# Patient Record
Sex: MALE | Race: WHITE
[De-identification: names, ages, dates, MRNs, and addresses within clinical notes are randomized per-mention and may not be internally consistent; named-entity substitution may affect disease eponyms.]

---

## 2017-12-17 ENCOUNTER — HOSPITAL ENCOUNTER (INPATIENT)
Dept: HOSPITAL 92 - ERS | Age: 82
LOS: 4 days | Discharge: HOME HEALTH | DRG: 286 | End: 2017-12-21
Attending: HOSPITALIST | Admitting: HOSPITALIST
Payer: MEDICARE

## 2017-12-17 VITALS — BODY MASS INDEX: 30.6 KG/M2

## 2017-12-17 DIAGNOSIS — N18.2: ICD-10-CM

## 2017-12-17 DIAGNOSIS — I25.10: ICD-10-CM

## 2017-12-17 DIAGNOSIS — I13.0: Primary | ICD-10-CM

## 2017-12-17 DIAGNOSIS — N40.0: ICD-10-CM

## 2017-12-17 DIAGNOSIS — Z86.73: ICD-10-CM

## 2017-12-17 DIAGNOSIS — F41.9: ICD-10-CM

## 2017-12-17 DIAGNOSIS — H35.30: ICD-10-CM

## 2017-12-17 DIAGNOSIS — E22.2: ICD-10-CM

## 2017-12-17 DIAGNOSIS — E66.9: ICD-10-CM

## 2017-12-17 DIAGNOSIS — Z79.02: ICD-10-CM

## 2017-12-17 DIAGNOSIS — S32.031S: ICD-10-CM

## 2017-12-17 DIAGNOSIS — I95.89: ICD-10-CM

## 2017-12-17 DIAGNOSIS — I50.33: ICD-10-CM

## 2017-12-17 DIAGNOSIS — E87.6: ICD-10-CM

## 2017-12-17 DIAGNOSIS — K59.09: ICD-10-CM

## 2017-12-17 DIAGNOSIS — F32.9: ICD-10-CM

## 2017-12-17 DIAGNOSIS — I35.0: ICD-10-CM

## 2017-12-17 LAB
ALP SERPL-CCNC: 66 U/L (ref 40–150)
ALT SERPL W P-5'-P-CCNC: 11 U/L (ref 8–55)
ANION GAP SERPL CALC-SCNC: 11 MMOL/L (ref 10–20)
AST SERPL-CCNC: 18 U/L (ref 5–34)
BASOPHILS # BLD AUTO: 0.1 THOU/UL (ref 0–0.2)
BASOPHILS NFR BLD AUTO: 0.6 % (ref 0–1)
BILIRUB SERPL-MCNC: 0.6 MG/DL (ref 0.2–1.2)
BUN SERPL-MCNC: 17 MG/DL (ref 8.4–25.7)
CALCIUM SERPL-MCNC: 8.6 MG/DL (ref 7.8–10.44)
CHLORIDE SERPL-SCNC: 93 MMOL/L (ref 98–107)
CO2 SERPL-SCNC: 25 MMOL/L (ref 23–31)
CREAT CL PREDICTED SERPL C-G-VRATE: 0 ML/MIN (ref 70–130)
EOSINOPHIL # BLD AUTO: 0.2 THOU/UL (ref 0–0.7)
EOSINOPHIL NFR BLD AUTO: 2.6 % (ref 0–10)
GLOBULIN SER CALC-MCNC: 3.2 G/DL (ref 2.4–3.5)
HCT VFR BLD CALC: 39.2 % (ref 42–52)
LYMPHOCYTES # BLD: 1.1 THOU/UL (ref 1.2–3.4)
LYMPHOCYTES NFR BLD AUTO: 12.4 % (ref 21–51)
MONOCYTES # BLD AUTO: 0.7 THOU/UL (ref 0.11–0.59)
MONOCYTES NFR BLD AUTO: 8 % (ref 0–10)
NEUTROPHILS # BLD AUTO: 6.7 THOU/UL (ref 1.4–6.5)
RBC # BLD AUTO: 4.03 MILL/UL (ref 4.7–6.1)
TROPONIN I SERPL DL<=0.01 NG/ML-MCNC: (no result) NG/ML (ref ?–0.03)
WBC # BLD AUTO: 8.8 THOU/UL (ref 4.8–10.8)

## 2017-12-17 PROCEDURE — 93454 CORONARY ARTERY ANGIO S&I: CPT

## 2017-12-17 PROCEDURE — 84550 ASSAY OF BLOOD/URIC ACID: CPT

## 2017-12-17 PROCEDURE — 84484 ASSAY OF TROPONIN QUANT: CPT

## 2017-12-17 PROCEDURE — 36416 COLLJ CAPILLARY BLOOD SPEC: CPT

## 2017-12-17 PROCEDURE — 85025 COMPLETE CBC W/AUTO DIFF WBC: CPT

## 2017-12-17 PROCEDURE — 83930 ASSAY OF BLOOD OSMOLALITY: CPT

## 2017-12-17 PROCEDURE — C1769 GUIDE WIRE: HCPCS

## 2017-12-17 PROCEDURE — 80069 RENAL FUNCTION PANEL: CPT

## 2017-12-17 PROCEDURE — 36415 COLL VENOUS BLD VENIPUNCTURE: CPT

## 2017-12-17 PROCEDURE — 85610 PROTHROMBIN TIME: CPT

## 2017-12-17 PROCEDURE — C1760 CLOSURE DEV, VASC: HCPCS

## 2017-12-17 PROCEDURE — 83935 ASSAY OF URINE OSMOLALITY: CPT

## 2017-12-17 PROCEDURE — 80048 BASIC METABOLIC PNL TOTAL CA: CPT

## 2017-12-17 PROCEDURE — 93005 ELECTROCARDIOGRAM TRACING: CPT

## 2017-12-17 PROCEDURE — 71010: CPT

## 2017-12-17 PROCEDURE — 80053 COMPREHEN METABOLIC PANEL: CPT

## 2017-12-17 PROCEDURE — A4216 STERILE WATER/SALINE, 10 ML: HCPCS

## 2017-12-17 PROCEDURE — 93798 PHYS/QHP OP CAR RHAB W/ECG: CPT

## 2017-12-17 PROCEDURE — 93306 TTE W/DOPPLER COMPLETE: CPT

## 2017-12-17 PROCEDURE — 76942 ECHO GUIDE FOR BIOPSY: CPT

## 2017-12-17 PROCEDURE — 82553 CREATINE MB FRACTION: CPT

## 2017-12-17 PROCEDURE — 83880 ASSAY OF NATRIURETIC PEPTIDE: CPT

## 2017-12-17 NOTE — RAD
RADIOGRAPH CHEST 1 VIEW:

 

Date: 12-17-17

Time: 8:52 p.m.

 

HISTORY: 

88-year-old male with dyspnea and chest tightness.

 

COMPARISON: 

Although the patient has had numerous prior imaging studies, none of them involve the chest. 

 

FINDINGS:

There are mild to moderate interstitial densities throughout the left mid and lower lung zones, and a
t the right base. The rest of the right lung is relatively clear. No cardiomegaly or pneumothorax. 

 

IMPRESSION:

Interstitial densities involving much of the left lung and the right base. It is uncertain whether th
jerrica are acute, chronic, or a combination of both. 

 

 

ABHINAV 

 

POS: GISEL

## 2017-12-18 LAB
ANION GAP SERPL CALC-SCNC: 10 MMOL/L (ref 10–20)
ANION GAP SERPL CALC-SCNC: 11 MMOL/L (ref 10–20)
ANION GAP SERPL CALC-SCNC: 12 MMOL/L (ref 10–20)
BASOPHILS # BLD AUTO: 0.1 THOU/UL (ref 0–0.2)
BASOPHILS NFR BLD AUTO: 0.9 % (ref 0–1)
BUN SERPL-MCNC: 14 MG/DL (ref 8.4–25.7)
BUN SERPL-MCNC: 15 MG/DL (ref 8.4–25.7)
BUN SERPL-MCNC: 18 MG/DL (ref 8.4–25.7)
CALCIUM SERPL-MCNC: 8.4 MG/DL (ref 7.8–10.44)
CALCIUM SERPL-MCNC: 8.7 MG/DL (ref 7.8–10.44)
CALCIUM SERPL-MCNC: 8.7 MG/DL (ref 7.8–10.44)
CHLORIDE SERPL-SCNC: 93 MMOL/L (ref 98–107)
CO2 SERPL-SCNC: 27 MMOL/L (ref 23–31)
CO2 SERPL-SCNC: 29 MMOL/L (ref 23–31)
CO2 SERPL-SCNC: 30 MMOL/L (ref 23–31)
CREAT CL PREDICTED SERPL C-G-VRATE: 81 ML/MIN (ref 70–130)
CREAT CL PREDICTED SERPL C-G-VRATE: 89 ML/MIN (ref 70–130)
CREAT CL PREDICTED SERPL C-G-VRATE: 95 ML/MIN (ref 70–130)
EOSINOPHIL # BLD AUTO: 0.3 THOU/UL (ref 0–0.7)
EOSINOPHIL NFR BLD AUTO: 3.8 % (ref 0–10)
HCT VFR BLD CALC: 37.7 % (ref 42–52)
LYMPHOCYTES # BLD: 1.6 THOU/UL (ref 1.2–3.4)
LYMPHOCYTES NFR BLD AUTO: 20.5 % (ref 21–51)
MONOCYTES # BLD AUTO: 0.8 THOU/UL (ref 0.11–0.59)
MONOCYTES NFR BLD AUTO: 11.1 % (ref 0–10)
NEUTROPHILS # BLD AUTO: 4.8 THOU/UL (ref 1.4–6.5)
PROTHROMBIN TIME: 13.4 SEC (ref 12–14.7)
RBC # BLD AUTO: 3.92 MILL/UL (ref 4.7–6.1)
TROPONIN I SERPL DL<=0.01 NG/ML-MCNC: 0.01 NG/ML (ref ?–0.03)
URATE SERPL-MCNC: 4.2 MG/DL (ref 3.5–7.2)
WBC # BLD AUTO: 7.5 THOU/UL (ref 4.8–10.8)

## 2017-12-18 RX ADMIN — Medication PRN ML: at 06:28

## 2017-12-18 NOTE — PDOC.EVN
Event Note





- Event Note


Event Note: 





[id number]


H&P Dictated


1. Acute CHF exacerbation


2. HTN


3. H/O BPH





Plan:


see orders

## 2017-12-18 NOTE — HP
DATE OF ADMISSION:  12/17/2017

 

CHIEF COMPLAINT:  Dyspnea.

 

HISTORY OF PRESENT ILLNESS:  The patient is an 88-year-old male with past medical history of congesti
ve heart failure, hypertension, transient ischemic attack, benign prostatic hypertrophy, came to the 
ER complaining of dyspnea, dyspnea started few days back, dyspnea _____, lying flat.  Denies chest pa
in, denies any chest tightness.  Complains of some low grade fever also.  The patient complains of so
me nausea and decreased p.o. intake and fatigue.  The patient's symptoms persisted that is why he cam
e to the ER.  The patient complains of chronic lower extremity swelling, but swelling is more pronoun
iglesia now, but improving with stockings.  Denies any fever, denies any cough, denies sputum production.


 

PAST MEDICAL HISTORY:  As per HPI.

 

PAST SURGICAL HISTORY:  Colon resection.

 

SOCIAL HISTORY:  Positive for alcohol usage.  Denies smoking.  Denies any drugs.

 

FAMILY HISTORY:  Denies any heart problems.

 

REVIEW OF SYSTEMS:  Constitutional:  Reports low grade fever.  Denies any chills.  Eyes:  Denies any 
vision problems.  Ears:  Denies any hearing loss.  Neck:  Denies any neck pain.  Cardiovascular syste
m:  Positive for chest pain, tightness, mild.  Respiratory system:  Positive for dyspnea.  Gastrointe
stinal:  Positive for nausea.  Cranial nerve system:  Denies any syncope, denies lightheadedness.  Mu
sculoskeletal:  Positive for bilateral lower extremity swelling.  Integumentary:  Denies any rash.  A
ll other review of systems are reviewed and are negative.

 

PHYSICAL EXAMINATION:

CONSTITUTIONAL/VITAL SIGNS:  At the time of H&P performed, blood pressure is 150/71, pulse ox 96%, he
art rate 61, respiratory rate 18.

GENERAL APPEARANCE:  The patient appears tired.

HEENT:  Anterior nares patent.

NECK:  Supple.  No JVD.

RESPIRATORY SYSTEM:  Positive for crackles.  Positive for occasional wheeze, no rhonchi.  Normal effo
rt.

CARDIOVASCULAR SYSTEM:  S1, S2 present.  Regular rate and rhythm.  No murmurs, no rubs, no gallops.

GASTROINTESTINAL:  Abdomen is soft, nontender, no guarding, no organomegaly, no masses felt.

MUSCULOSKELETAL:  Bilateral lower extremities, 2+ pitting edema present.  Moves all joints.

PSYCHIATRIC:  Mood is appropriate at this time.

INTEGUMENTARY:  No obvious rashes seen.

 

LABORATORY DATA:  At the time of H&P performed, white count 8.8, hemoglobin 13, and platelet count is
 262.  BMP shows sodium 125, potassium 4.4, chloride 93, CO2 of 25, BUN of 17, creatinine 0.82.  BNP 
897, troponin less than 0.010.  Chest x-ray positive for interstitial densities involving left lung a
nd the right base.

 

ASSESSMENT AND PLAN:  The patient is an 88-year-old male.

1.  Acute congestive heart failure exacerbation, possible systolic.  Plan to start the patient on IV 
Lasix 40 mg b.i.d.  Plan to monitor the patient closely.

2.  Hyponatremia.  Monitor sodium level closely.  Plan to consult Nephrology to evaluate the patient 
and we will check repeat BMP in 4 hours.

3.  History of hypertension.  Monitor blood pressure.  Continue home blood pressure medications.

4.  History of benign prostatic hypertrophy.  Continue Flomax.

5.  History of transient ischemic attack.  Continue Plavix.

 

The case was discussed in detail with the patient and patient's family members also.

## 2017-12-18 NOTE — CON
DATE OF SERVICE:  12/18/2017

 

CARDIOLOGY CONSULTATION

 

INDICATION FOR CONSULTATION:  An 88-year-old gentleman with new onset of congestive heart failure.

 

HISTORY OF PRESENT ILLNESS:  This 88-year-old gentleman who has been followed by Dr. Christensen for qu
ite some time now.  He has a history of aortic valve stenosis as well as hypertension.  He recently w
as seen with some hyponatremia, I believe in an outside facility and was treated with diuretics and i
mproved.  He then yesterday started developing increasing shortness of breath and dyspnea.  He was br
ought to the hospital by his family and was admitted with some congestive heart failure symptoms and 
hyponatremia.  At this time, he denied any chest pain.  He does not have any significant shortness of
 breath at this time, but he is lying in the bed and is inactive.  He did have an echocardiogram in J
roberto carlos of last year which showed an ejection fraction of 55%-60% with severe aortic valve stenosis, aort
ic valve area 0.62 cm2.  There has been no intervention or further workup for the aortic valve since 
that time.  He does have mild mitral and tricuspid valve regurgitation also.  At this time, he has di
uresed quite a bit and is feeling better and will continue on his present medications.

 

PAST MEDICAL HISTORY:  Significant for hypertension, TIA, aortic valve stenosis, L1 burst fracture, h
istory of colon cancer with colectomy.  He also has obesity.

 

SOCIAL HISTORY:  He has 2 children who are alive and well.  There is no alcohol or tobacco abuse.

 

FAMILY HISTORY:  Noncontributory.

 

ALLERGIES:  None.

 

MEDICATIONS:  Prior to admission included Coreg at 12.5 mg b.i.d. He is taking Plavix 75 mg a day, ca
lcitonin, tamsulosin, takes timolol ophthalmic drops, Lumigan ophthalmic drops, lorazepam, tramadol, 
and hydrocortisone cream.

 

REVIEW OF SYSTEMS:  Twelve point review of systems is unremarkable except what was noted in the histo
ry of present illness.  He does have macular degeneration and takes ophthalmic drops.  He also has ch
ronic constipation and takes a stool softer on a daily basis at least every other day.

 

PHYSICAL EXAMINATION:

GENERAL:  Reveals an elderly gentleman in no acute distress at this time.

VITAL SIGNS:  Blood pressure 156/70, he was given lisinopril and blood pressure decreased to 91/52, h
eart rate is 73 and irregular.  He is afebrile.  O2 saturation 95%, respiratory rate 18.

HEENT:  Reveals head to be normocephalic and atraumatic.  Carotid pulses are present.  No bruits are 
noted.

CHEST:  Clear to auscultation.  He has very minimal rales noted in the right base.

CARDIOVASCULAR:  There were no significant murmurs, heaves, thrills, bruits or rubs except for the ao
rtic valve stenosis which is a 3/6 murmur which radiates to the upper sternal area.

ABDOMEN:  Shows obesity with positive bowel sounds.

EXTREMITIES:  Showed no clubbing or cyanosis.  He has 1-2+ lower extremity edema in lower legs and th
e ankle area.  Pedal pulses are present.

NEUROLOGIC:  The patient appears to be intact.

 

IMAGING DATA:  EKG shows normal sinus rhythm with a right bundle branch block.

 

LABORATORY DATA:  Shows sodium of 129, potassium 3.6, creatinine 0.83.  His BNP was 897.

 

IMPRESSION:

1.  New onset congestive heart failure which may be due to volume overload associated with perhaps di
astolic dysfunction or due to his aortic valve stenosis or due to just volume overload.  I would agre
e with some diuresis.  At this time he seems to have improved already after starting diuresis.  I wou
ld need to reevaluate the aortic valve by echocardiogram to see whether or not this may be more signi
ficant stenosis and this has been his thought.  He may eventually need to undergo cardiac catheteriza
tion to determine the exact gradient across the aortic valve indeterminate size.  He may be a candida
te to undergo a TAVR versus consensual aortic valve replacement if this is so indicated.

2.  Hyponatremia and hypokalemia.  These will be treated medically and he seems to be improving.  We 
will await results of the echocardiogram prior to further recommendations per Dr. Christensen who will 
resume his care when he sees him tomorrow.

## 2017-12-18 NOTE — PDOC.PN
- Subjective


Encounter Start Date: 12/18/17


Encounter Start Time: 16:25


Subjective: Seen and examined -transient hypotension noted earlier today





- Objective


Vital Signs & Weight: 


 Vital Signs (12 hours)











  Temp Pulse Resp BP Pulse Ox


 


 12/18/17 12:21  98.1 F  59 L  16  128/59 L  95


 


 12/18/17 11:00     91/52 L 


 


 12/18/17 08:45  97.3 F L  73  18  156/70 H  95


 


 12/18/17 08:05  97.3 F L  73  18  








 Weight











Weight                         225 lb 1.6 oz














I&O: 


 











 12/17/17 12/18/17 12/19/17





 06:59 06:59 06:59


 


Intake Total  360 


 


Output Total  800 


 


Balance  -440 











Result Diagrams: 


 12/18/17 05:03





 12/18/17 10:24





Phys Exam





- Physical Examination


Constitutional: NAD


HEENT: PERRLA, moist MMs, sclera anicteric, TM's clear


Neck: no nodes, no JVD, supple, full ROM


Respiratory: no wheezing, no rales, no rhonchi, clear to auscultation bilateral


Cardiovascular: RRR, no significant murmur, no rub


Gastrointestinal: soft, non-tender, no distention, positive bowel sounds


Musculoskeletal: pulses present, edema present





Dx/Plan


(1) Hypotension


Status: Acute   





(2) CHF (congestive heart failure)


Code(s): I50.9 - HEART FAILURE, UNSPECIFIED   Status: Acute   





(3) Hyponatremia


Code(s): E87.1 - HYPO-OSMOLALITY AND HYPONATREMIA   Status: Acute   Comment: 

Improving, Cz=642   





(4) Aortic stenosis, severe


Code(s): I35.0 - NONRHEUMATIC AORTIC (VALVE) STENOSIS   Status: Chronic   





(5) BPH (benign prostatic hyperplasia)


Code(s): N40.0 - BENIGN PROSTATIC HYPERPLASIA WITHOUT LOWER URINRY TRACT SYMP   

Status: Chronic   





- Plan


plan discussed w/ family, PT/OT, respiratory therapy


Reduce Acei dose


-: monitor oerthostasis and BP


-: Cardiology/Renal following


-: Dispo planning





* .

## 2017-12-19 LAB
ANION GAP SERPL CALC-SCNC: 12 MMOL/L (ref 10–20)
BUN SERPL-MCNC: 17 MG/DL (ref 8.4–25.7)
CALCIUM SERPL-MCNC: 9 MG/DL (ref 7.8–10.44)
CHLORIDE SERPL-SCNC: 91 MMOL/L (ref 98–107)
CO2 SERPL-SCNC: 30 MMOL/L (ref 23–31)
CREAT CL PREDICTED SERPL C-G-VRATE: 86 ML/MIN (ref 70–130)
TROPONIN I SERPL DL<=0.01 NG/ML-MCNC: (no result) NG/ML (ref ?–0.03)

## 2017-12-19 PROCEDURE — B2111ZZ FLUOROSCOPY OF MULTIPLE CORONARY ARTERIES USING LOW OSMOLAR CONTRAST: ICD-10-PCS | Performed by: INTERNAL MEDICINE

## 2017-12-19 RX ADMIN — Medication PRN ML: at 06:10

## 2017-12-19 NOTE — CON
DATE OF CONSULTATION:  12/19/2017

 

HISTORY OF PRESENT ILLNESS:  This is an 88-year-old gentleman admitted with congestive heart failure 
symptoms.  He has had progressive dyspnea and lower extremity edema.  He has been followed by Dr. Patricia villasenor for at least the past 6-10 years.  His medical diagnoses include hypertension.  Last year, he 
suffered a compression fracture at the L3 vertebral body and cardiac echo at that time demonstrated s
[id number] aortic valve stenosis.  Repeat echo was done this admission; however, the final rep
ort is not available, but the preliminary report suggested a peak gradient of about 80 with a valve a
digna of 0.65, which is similar, I believe, to the echo about a year and a half ago.  In addition, he h
as undergone cardiac catheterization demonstrating severe circumflex disease prior to a distal obtuse
 marginal and severe proximal LAD disease proximal to an LAD diagonal bifurcation with only mild righ
t coronary artery disease.

 

PAST MEDICAL HISTORY:  As mentioned, his past medical history includes a diagnosis of hypertension as
 well as obesity.  He has had a prior transient ischemic attack and has prostatic hypertrophy.

 

PAST SURGICAL HISTORY:  Includes colon resection and arthroplasty for the L3 burst fracture.

 

SOCIAL HISTORY:  He is a retired , lives alone, although has a daughter and son who pro
vide nearly 24-hour assistance.  He gets around with a wheeled walker and does walk in the driveway e
veryday.

 

IMAGING STUDIES:  Chest x-ray is unremarkable.

 

LABORATORY VALUES:  Notable for a creatinine of 0.9 and a hemoglobin of 12.8.  He does have a first-d
egree AV block as well as a bundle branch block.

 

PHYSICAL EXAMINATION:  To follow.

 

At this time, the patient is recovering from his cardiac catheterization and a recent bout of congest
n[place] heart failure.

 

MEDICATIONS:  Prior to admission included eye drops, Plavix 75 a day, Flomax 0.4 a day, MiraLax daily
, aspirin 81 a day, Coreg 12.5 b.i.d., lorazepam 0.5 p.r.n.  Since in the hospital, he has had the ad
dition of Lasix periodic IV push as well as 2.5 of lisinopril daily.

 

ASSESSMENT AND PLAN:  I have had a long discussion with the son and daughter and at this time, I thin
k the patient should be medically managed for his heart failure and discharged off his Plavix and I w
ill see him back in the office in a couple of weeks and if he is functionally recovered from his hosp
ital stay, consider aortic valve replacement and coronary bypass grafting to the left anterior descen
ding and obtuse marginal.  Preliminary STS risk factors without complete echocardiogram results sugge
st a mortality of 5.7%, morbidity of 31%, stroke of about 3%, renal failure of about 10%, and re-oper
ation of about 11%.

## 2017-12-19 NOTE — PRG
DATE OF SERVICE:  12/19/2017

 

SUBJECTIVE:  This is an 88-year-old gentleman being seen for hyponatremia.  The patient denies any na
usea, vomiting, or chest pain.

 

PHYSICAL EXAMINATION:

GENERAL:  Patient is awake, alert.

VITAL SIGNS:  Afebrile, pulse 58, breathing at 16, blood pressure 121/60.

 

OBJECTIVE:   See above.

Awake, alert, in no acute distress.

GENERAL APPEARANCE AND MENTAL STATUS:  Fair.

HEAD/NECK:  Normocephalic.   Atraumatic.

EYES:  EOMI.  No deformity.

EARS:  Clear.  No ulcers.

NOSE:   Intact.  No lesions.

MOUTH:  Clear.  No discharge.

THROAT:  Clear.  No exudate.

LUNGS:   Clear.  No crackles.

CARDIAC:   S1, S2.  No rub.

ABDOMEN:   Benign.  BS+.

GENITALIA/RECTUM:  Pearce absent.

BACK/EXTREMITIES:  Edema 0+ Ulcer-

NEUROLOGICAL:  Alert and motor intact.                     

SKIN:  Rash- Bruise-  

LYMPHATICS:  Edema- Ulcer-

 

LABORATORY:  Hemoglobin 12.5, sodium is pending. 

 

ASSESSMENT AND RECOMMENDATIONS:  

1.  Stage II chronic kidney disease, stable.

2.  Hyponatremia.  We will recheck sodium today.

3.  Hypertension, stable.

4.  Medications based on glomerular filtration rate are appropriate.

5.  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

## 2017-12-19 NOTE — PRG
DATE OF SERVICE:  12/19/2017

 

Mr. Calvert is feeling much better.  His shortness of breath has improved.  He has diuresed.

 

PHYSICAL EXAMINATION: 

VITAL SIGNS:  Blood pressure 157/70, pulse 89, TEMPERATURE 97.7.

LUNGS:  Clear to auscultation.

CARDIAC:  Regular rate and rhythm with a 2/6 systolic ejection murmur.

ABDOMEN:  Soft.  Nontender.

EXTREMITIES:  No edema.

 

PERTINENT LABORATORY DATA:  Hemoglobin 12.5, creatinine 0.91.

 

Echo with Doppler shows severe aortic stenosis with valve area 0.68 with a mean peak gradient of 90 a
nd 70.

 

IMPRESSION:

1.  Severe aortic stenosis.

2.  Shortness of breath.

 

RECOMMENDATIONS:  I had a long discussion with Mr. Calvert as well as his son about options.  We discus
sed SVAR versus TVAR  in addition to medical therapy.  I did state that medical therapy in this case 
would not be indicated.  I did have a long discussion with the progression of workup including left a
nd right heart catheterization in addition to a consultation with a surgeon to determine his risk.  I
f felt to be an increased risk, I discussed consideration for TVAR.  I discussed coronary angiography
 with possible PCI in full detail.  The risks of the procedure included, but are not limited to the f
ollowing:  Death, stroke, MI, need for emergency surgery, loss of limb, bleeding, and infection, as w
ell as a reaction to the dye causing kidney failure and needing long-term dialysis.  I also discussed
 the risks of PCI to include all of the above including coronary dissection and perforation in additi
on to acute stent thrombosis and restenosis.  All questions about the procedure were answered.  Given
 the above, the patient agreed to proceed with coronary angiography and possible PCI.

 

I also discussed right heart catheterization.  Otherwise, at this point, I have no further recommenda
tions.

## 2017-12-20 LAB
ANION GAP SERPL CALC-SCNC: 12 MMOL/L (ref 10–20)
ANION GAP SERPL CALC-SCNC: 13 MMOL/L (ref 10–20)
BASOPHILS # BLD AUTO: 0 THOU/UL (ref 0–0.2)
BASOPHILS NFR BLD AUTO: 0.5 % (ref 0–1)
BUN SERPL-MCNC: 17 MG/DL (ref 8.4–25.7)
BUN SERPL-MCNC: 19 MG/DL (ref 8.4–25.7)
CALCIUM SERPL-MCNC: 8.5 MG/DL (ref 7.8–10.44)
CALCIUM SERPL-MCNC: 8.8 MG/DL (ref 7.8–10.44)
CHLORIDE SERPL-SCNC: 93 MMOL/L (ref 98–107)
CHLORIDE SERPL-SCNC: 93 MMOL/L (ref 98–107)
CO2 SERPL-SCNC: 28 MMOL/L (ref 23–31)
CO2 SERPL-SCNC: 29 MMOL/L (ref 23–31)
CREAT CL PREDICTED SERPL C-G-VRATE: 73 ML/MIN (ref 70–130)
CREAT CL PREDICTED SERPL C-G-VRATE: 89 ML/MIN (ref 70–130)
EOSINOPHIL # BLD AUTO: 0.3 THOU/UL (ref 0–0.7)
EOSINOPHIL NFR BLD AUTO: 3.3 % (ref 0–10)
HCT VFR BLD CALC: 39.5 % (ref 42–52)
LYMPHOCYTES # BLD: 1.1 THOU/UL (ref 1.2–3.4)
LYMPHOCYTES NFR BLD AUTO: 13.1 % (ref 21–51)
MONOCYTES # BLD AUTO: 1.1 THOU/UL (ref 0.11–0.59)
MONOCYTES NFR BLD AUTO: 13.1 % (ref 0–10)
NEUTROPHILS # BLD AUTO: 5.8 THOU/UL (ref 1.4–6.5)
RBC # BLD AUTO: 4.06 MILL/UL (ref 4.7–6.1)
WBC # BLD AUTO: 8.3 THOU/UL (ref 4.8–10.8)

## 2017-12-20 RX ADMIN — CALCITONIN SALMON SCH SPR: 200 SPRAY, METERED NASAL at 10:06

## 2017-12-20 NOTE — ADD-CON
ADDENDUM

 

Of my consult from yesterday.

 

I had a chance to examine the patient today with the family present.  He is alert and cooperative, re
ceiving an IV bolus of fluid for slightly depressed blood pressure, although he is not symptomatic.  
Neck exam, I do not appreciate any bruits.  Cardiac exam, he has a harsh systolic murmur across the p
recordium.  Lungs are clear to auscultation.  Abdomen was obese and nontender.  Extremities, he has p
alpable femoral and popliteal pulses and I do not appreciate any pedal pulses and he does not have an
y peripheral edema today.

 

I once again discussed the situation this time with the patient primarily going over the options of s
urgical intervention and risks and recovery issues.  I have given the son, the STS risk stratificatio
n for the patient based on the numbers that I had available last night.  His final echo report has be
come available with a peak gradient of about 90, mean gradient of about 50 and an aortic valve area o
f about 0.65.  Peak velocity is about 485 cm per second.  The patient will follow up with me next mon
th if he wishes to discuss this further.  If he wishes to have surgery, he can call the office and 
t that scheduled and if he does not wish to have any intervention, then he will need to follow up live Christensen for medical management.

## 2017-12-20 NOTE — PDOC.PN
- Subjective


Encounter Start Date: 12/19/17


Encounter Start Time: 14:39





Patient seen and examined. No new complaints. No overnight events





- Objective


MAR Reviewed: Yes


Vital Signs & Weight: 


 Vital Signs (12 hours)











  Temp Pulse Resp BP BP Pulse Ox


 


 12/20/17 11:21  97.3 F L  59 L  20   145/65 H  95


 


 12/20/17 08:32   65   82/48 L  


 


 12/20/17 06:13     126/56 L  


 


 12/20/17 04:00  97.5 F L  61  20   122/58 L  100








 Weight











Weight                         212 lb 3 oz














I&O: 


 











 12/19/17 12/20/17 12/21/17





 06:59 06:59 06:59


 


Intake Total 1144 1580 


 


Output Total 1780 3850 


 


Balance -966 -9540 











Result Diagrams: 


 12/20/17 08:53





 12/20/17 08:53





Phys Exam





- Physical Examination


Constitutional: NAD


HEENT: PERRLA


Neck: no JVD


bibasilar rales


Cardiovascular: no significant murmur


Gastrointestinal: non-tender


Musculoskeletal: edema present


Neurological: moves all 4 limbs


Psychiatric: A&O x 3





Dx/Plan


(1) Diastolic CHF, acute on chronic


Code(s): I50.33 - ACUTE ON CHRONIC DIASTOLIC (CONGESTIVE) HEART FAILURE   Status

: Acute   Comment: ef- 55%   





(2) Compression fracture of L3 lumbar vertebra


Code(s): S32.030A - WEDGE COMPRESSION FRACTURE OF THIRD LUMBAR VERTEBRA, INIT   

Status: Acute   Comment: s/p kyphoplasty   





(3) Hyponatremia


Code(s): E87.1 - HYPO-OSMOLALITY AND HYPONATREMIA   Status: Acute   Comment: 

Improving, Nc=663   





(4) Physical deconditioning


Code(s): R53.81 - OTHER MALAISE   Status: Acute   





(5) Anxiety and depression


Code(s): F41.9 - ANXIETY DISORDER, UNSPECIFIED; F32.9 - MAJOR DEPRESSIVE 

DISORDER, SINGLE EPISODE, UNSPECIFIED   Status: Chronic   





(6) Aortic stenosis, severe


Code(s): I35.0 - NONRHEUMATIC AORTIC (VALVE) STENOSIS   Status: Chronic   





(7) BPH (benign prostatic hyperplasia)


Code(s): N40.0 - BENIGN PROSTATIC HYPERPLASIA WITHOUT LOWER URINRY TRACT SYMP   

Status: Chronic   





(8) Hypertension


Code(s): I10 - ESSENTIAL (PRIMARY) HYPERTENSION   Status: Chronic   Comment: BP 

improved.   





- Plan





* cardiac rehab


* cont diuresis


* f/u card plan


* re evaluate for surg as out pt

## 2017-12-20 NOTE — PDOC.PN
- Subjective


Encounter Start Date: 12/20/17


Encounter Start Time: 14:41





breathing better


no cp


had pci 12/19


no n/v





- Objective


MAR Reviewed: Yes


Vital Signs & Weight: 


 Vital Signs (12 hours)











  Temp Pulse Resp BP BP Pulse Ox


 


 12/20/17 11:21  97.3 F L  59 L  20   145/65 H  95


 


 12/20/17 08:32   65   82/48 L  


 


 12/20/17 06:13     126/56 L  


 


 12/20/17 04:00  97.5 F L  61  20   122/58 L  100








 Weight











Weight                         212 lb 3 oz














I&O: 


 











 12/19/17 12/20/17 12/21/17





 06:59 06:59 06:59


 


Intake Total 1144 1580 


 


Output Total 1780 3850 


 


Balance -366 -0680 











Result Diagrams: 


 12/20/17 08:53





 12/20/17 08:53





Phys Exam





- Physical Examination


Constitutional: NAD


HEENT: PERRLA


Neck: no JVD


bibasilar rales


Cardiovascular: RRR


Gastrointestinal: non-tender


Musculoskeletal: edema present


Neurological: moves all 4 limbs


Psychiatric: A&O x 3





Dx/Plan


(1) Diastolic CHF, acute on chronic


Code(s): I50.33 - ACUTE ON CHRONIC DIASTOLIC (CONGESTIVE) HEART FAILURE   Status

: Acute   Comment: ef- 55%   





(2) Compression fracture of L3 lumbar vertebra


Code(s): S32.030A - WEDGE COMPRESSION FRACTURE OF THIRD LUMBAR VERTEBRA, INIT   

Status: Acute   Comment: s/p kyphoplasty   





(3) Hyponatremia


Code(s): E87.1 - HYPO-OSMOLALITY AND HYPONATREMIA   Status: Acute   Comment: 

Improving, Ph=260   





(4) Physical deconditioning


Code(s): R53.81 - OTHER MALAISE   Status: Acute   





(5) Anxiety and depression


Code(s): F41.9 - ANXIETY DISORDER, UNSPECIFIED; F32.9 - MAJOR DEPRESSIVE 

DISORDER, SINGLE EPISODE, UNSPECIFIED   Status: Chronic   





(6) Aortic stenosis, severe


Code(s): I35.0 - NONRHEUMATIC AORTIC (VALVE) STENOSIS   Status: Chronic   





(7) BPH (benign prostatic hyperplasia)


Code(s): N40.0 - BENIGN PROSTATIC HYPERPLASIA WITHOUT LOWER URINRY TRACT SYMP   

Status: Chronic   





(8) Hypertension


Code(s): I10 - ESSENTIAL (PRIMARY) HYPERTENSION   Status: Chronic   Comment: BP 

improved.   





- Plan





* cont current mx


* 


* f/u card plan

## 2017-12-20 NOTE — PRG
DATE OF SERVICE:  12/20/2017

 

SUBJECTIVE:  Mr. Calvert is doing well.  No current complaints.  He did visit with Dr. Velasquez Reis with
 the recommendations below.

 

OBJECTIVE:

VITAL SIGNS:  Blood pressure is 145/65, pulse is 59 and temperature is 97.3.

LUNGS:  Clear to auscultation.

HEART:  Regular rate and rhythm.

ABDOMEN:  Soft, nontender and nondistended.

EXTREMITIES:  No edema.

 

IMPRESSION:

1.  Severe aortic stenosis.

2.  Severe left main disease.

 

RECOMMENDATIONS:  Mr. Calvert has spoken with Dr. Reis at length.  They have decided to proceed with cristina mc as an outpatient.  They would like to increase his ambulation prior to proceeding with AVR a
nd bypass surgery.  Several questions were answered.  From my standpoint, it would be okay for discha
renee from a cardiac standpoint.  We will continue aspirin in addition with carvedilol.  We would stop 
Plavix and continue lisinopril.

## 2017-12-21 VITALS — TEMPERATURE: 95.8 F | SYSTOLIC BLOOD PRESSURE: 152 MMHG | DIASTOLIC BLOOD PRESSURE: 69 MMHG

## 2017-12-21 LAB
ANION GAP SERPL CALC-SCNC: 12 MMOL/L (ref 10–20)
BUN SERPL-MCNC: 23 MG/DL (ref 8.4–25.7)
BUN/CREAT SERPL: 28.4
CALCIUM SERPL-MCNC: 8.3 MG/DL (ref 7.8–10.44)
CHLORIDE SERPL-SCNC: 95 MMOL/L (ref 98–107)
CO2 SERPL-SCNC: 26 MMOL/L (ref 23–31)
CREAT CL PREDICTED SERPL C-G-VRATE: 86 ML/MIN (ref 70–130)

## 2017-12-21 RX ADMIN — Medication PRN ML: at 06:25

## 2017-12-21 RX ADMIN — CALCITONIN SALMON SCH SPR: 200 SPRAY, METERED NASAL at 07:50

## 2017-12-21 NOTE — DIS
DATE OF ADMISSION:  12/18/2017

 

DATE OF DISCHARGE:  12/21/2017

 

DISCHARGE DIAGNOSES:

1.  Coronary artery disease of left main and left anterior descending.

2.  Severe aortic stenosis.

3.  Acute on chronic diastolic congestive heart failure with ejection fraction of 55%, stable.

4.  Hypertension.

5.  Transient ischemic attack, stable.

6.  Benign prostatic hyperplasia, stable.

7.  Also, physical deconditioning.

8.  Anxiety and depression, stable.

9.  Compression fracture of the L3 vertebra.

 

CONSULTANTS:  The patient's consultants on the case were Cardiology and Cardiovascular-Thoracic Surge
ry.

 

DISPOSITION:  To home with cardiac rehabilitation.

 

DISCHARGE MEDICATIONS:  The patient's medications on discharge include all home medications plus no P
lavix as per Cardiology, 81 mg of aspirin p.o. daily, Coreg 6.25 mg p.o. b.i.d., Lasix 40 mg p.o. franklin
ly, and continuation of other home medications.

 

BRIEF HOSPITAL COURSE:  This is an 88-year-old pleasant gentleman, who came into the hospital with dy
spnea.  Please refer to the admitting physician's H and P for further details.  The patient was diagn
osed with diastolic congestive heart failure exacerbation and was put on medications for that and giv
en Lasix.  He also had a cardiac catheterization done on the 19th, which showed severe left main dise
ase, LAD disease, and aortic stenosis, and they recommended CABG and AVR.  The patient was seen by Ca
rdiovascular-thoracic surgeon, who said that the patient needs to gain some strength to improve the c
hances of success post surgery.  Right now, patient is medically managed for the congestive heart chikis
lure.  He is doing much better with respect to that.  Cardiac rehab has been in place.  They are work
ing with him to improve his physical conditioning.  He is right now medically stable to be discharged
 with outpatient followup with Cardiology and Cardiovascular-Thoracic Surgery and they will come up w
ith a date as an outpatient for the possible surgery.  He is right now medically stable to be dischar
ged.  He is asked to come back to the emergency room in case symptoms recur.

 

Total time for this discharge took 35 minutes.

## 2017-12-21 NOTE — PDOC.PN
- Subjective


Encounter Start Date: 12/21/17


Encounter Start Time: 13:18





Patient seen and examined. No new complaints. No overnight events





- Objective


MAR Reviewed: Yes


Vital Signs & Weight: 


 Vital Signs (12 hours)











  Temp Pulse Resp BP BP Pulse Ox


 


 12/21/17 11:10  98.5 F  57 L  13   148/67 H  96


 


 12/21/17 07:52   56 L    


 


 12/21/17 07:47  98.1 F  56 L  16  154/70 H   96


 


 12/21/17 06:27   56 L  20   138/67 








 Weight











Weight                         217 lb 2 oz














I&O: 


 











 12/20/17 12/21/17 12/22/17





 06:59 06:59 06:59


 


Intake Total 1580 240 


 


Output Total 3850 400 


 


Balance -2270 -160 











Result Diagrams: 


 12/20/17 08:53





 12/21/17 05:03





Phys Exam





- Physical Examination


Constitutional: NAD


HEENT: PERRLA


Neck: no JVD


Respiratory: no wheezing


Cardiovascular: no significant murmur


misael


Gastrointestinal: non-tender


Musculoskeletal: pulses present


Neurological: moves all 4 limbs


Psychiatric: A&O x 3





Dx/Plan


(1) Diastolic CHF, acute on chronic


Code(s): I50.33 - ACUTE ON CHRONIC DIASTOLIC (CONGESTIVE) HEART FAILURE   Status

: Acute   Comment: ef- 55%   





(2) Compression fracture of L3 lumbar vertebra


Code(s): S32.030A - WEDGE COMPRESSION FRACTURE OF THIRD LUMBAR VERTEBRA, INIT   

Status: Acute   Comment: s/p kyphoplasty   





(3) Hyponatremia


Code(s): E87.1 - HYPO-OSMOLALITY AND HYPONATREMIA   Status: Acute   Comment: 

Improving, Ld=125   





(4) Physical deconditioning


Code(s): R53.81 - OTHER MALAISE   Status: Acute   





(5) Anxiety and depression


Code(s): F41.9 - ANXIETY DISORDER, UNSPECIFIED; F32.9 - MAJOR DEPRESSIVE 

DISORDER, SINGLE EPISODE, UNSPECIFIED   Status: Chronic   





(6) Aortic stenosis, severe


Code(s): I35.0 - NONRHEUMATIC AORTIC (VALVE) STENOSIS   Status: Chronic   





(7) BPH (benign prostatic hyperplasia)


Code(s): N40.0 - BENIGN PROSTATIC HYPERPLASIA WITHOUT LOWER URINRY TRACT SYMP   

Status: Chronic   





(8) Hypertension


Code(s): I10 - ESSENTIAL (PRIMARY) HYPERTENSION   Status: Chronic   Comment: BP 

improved.   





- Plan





* doing well


* d/c home


* cardiac rehab in place


* out pt f/u with card and cvts

## 2019-02-05 ENCOUNTER — HOSPITAL ENCOUNTER (EMERGENCY)
Dept: HOSPITAL 92 - ERS | Age: 84
Discharge: HOME | End: 2019-02-05
Payer: MEDICARE

## 2019-02-05 DIAGNOSIS — Z79.82: ICD-10-CM

## 2019-02-05 DIAGNOSIS — Z79.899: ICD-10-CM

## 2019-02-05 DIAGNOSIS — M54.16: Primary | ICD-10-CM

## 2019-02-05 DIAGNOSIS — I50.9: ICD-10-CM

## 2019-02-05 DIAGNOSIS — E53.8: ICD-10-CM

## 2019-02-05 DIAGNOSIS — I11.0: ICD-10-CM

## 2019-02-05 DIAGNOSIS — Z86.73: ICD-10-CM

## 2019-02-05 LAB
ALBUMIN SERPL BCG-MCNC: 3.6 G/DL (ref 3.4–4.8)
ALP SERPL-CCNC: 76 U/L (ref 40–150)
ALT SERPL W P-5'-P-CCNC: 11 U/L (ref 8–55)
ANION GAP SERPL CALC-SCNC: 12 MMOL/L (ref 10–20)
AST SERPL-CCNC: 17 U/L (ref 5–34)
BASOPHILS # BLD AUTO: 0.1 THOU/UL (ref 0–0.2)
BASOPHILS NFR BLD AUTO: 1.1 % (ref 0–1)
BILIRUB SERPL-MCNC: 0.4 MG/DL (ref 0.2–1.2)
BUN SERPL-MCNC: 12 MG/DL (ref 8.4–25.7)
CALCIUM SERPL-MCNC: 9.2 MG/DL (ref 7.8–10.44)
CHLORIDE SERPL-SCNC: 98 MMOL/L (ref 98–107)
CO2 SERPL-SCNC: 26 MMOL/L (ref 23–31)
CREAT CL PREDICTED SERPL C-G-VRATE: 0 ML/MIN (ref 70–130)
EOSINOPHIL # BLD AUTO: 0.3 THOU/UL (ref 0–0.7)
EOSINOPHIL NFR BLD AUTO: 3.5 % (ref 0–10)
GLOBULIN SER CALC-MCNC: 2.9 G/DL (ref 2.4–3.5)
GLUCOSE SERPL-MCNC: 104 MG/DL (ref 83–110)
HGB BLD-MCNC: 12.7 G/DL (ref 14–18)
LYMPHOCYTES # BLD: 1.5 THOU/UL (ref 1.2–3.4)
LYMPHOCYTES NFR BLD AUTO: 19.5 % (ref 21–51)
MCH RBC QN AUTO: 30.7 PG (ref 27–31)
MCV RBC AUTO: 95 FL (ref 78–98)
MONOCYTES # BLD AUTO: 0.7 THOU/UL (ref 0.11–0.59)
MONOCYTES NFR BLD AUTO: 9.2 % (ref 0–10)
NEUTROPHILS # BLD AUTO: 5 THOU/UL (ref 1.4–6.5)
NEUTROPHILS NFR BLD AUTO: 66.7 % (ref 42–75)
PLATELET # BLD AUTO: 287 THOU/UL (ref 130–400)
POTASSIUM SERPL-SCNC: 3.9 MMOL/L (ref 3.5–5.1)
RBC # BLD AUTO: 4.14 MILL/UL (ref 4.7–6.1)
SODIUM SERPL-SCNC: 132 MMOL/L (ref 136–145)
WBC # BLD AUTO: 7.5 THOU/UL (ref 4.8–10.8)

## 2019-02-05 PROCEDURE — 36415 COLL VENOUS BLD VENIPUNCTURE: CPT

## 2019-02-05 PROCEDURE — 72131 CT LUMBAR SPINE W/O DYE: CPT

## 2019-02-05 PROCEDURE — 72192 CT PELVIS W/O DYE: CPT

## 2019-02-05 PROCEDURE — 80053 COMPREHEN METABOLIC PANEL: CPT

## 2019-02-05 PROCEDURE — 85025 COMPLETE CBC W/AUTO DIFF WBC: CPT

## 2019-02-05 NOTE — CT
CT LUMBAR SPINE 

2/5/19

 

COMPARISON:  

Comparison made to previous exam from 7/11/16.

 

Axial images are obtained with coronal and sagittal reconstructions. 

 

CT images demonstrate severe osteoporosis in the lumbar spine. 

 

There is vertebra plana with complete collapse of the L1 vertebral body unchanged since the previous 
exam. This has resulted in posterior displacement of the L1 vertebrae into the spinal canal resulting
 in moderate to severe central and lateral recess stenosis unchanged since the previous comparison CT
 from 2015. 

 

There does appears to be previous old L3 fracture which has undergoing vertebroplasty. There is some 
height loss at the L3 level compared to the 2016 exam. 

 

Multilevel vacuum disc changes seen at T12-L1, L1-2, L2-3, L3-4, L4-5, L5-S1. Extensive chronic degen
erative changes seen involving multiple facets bilaterally. No significant interval changes seen sinc
e the 2016 exam except for the above mentioned vertebroplasty.

 

IMPRESSION:  

Extensive lumbar degenerative changes as described above. 

 

POS: GISEL

## 2019-02-05 NOTE — CT
HISTORY: 

Bilateral hip pain. 

 

CT pelvis obtained. 

 

Coronal reconstructed images performed. 

 

CT images pelvis demonstrates no evidence of acute pelvic fractures. 

 

IMPRESSION:  

No CT evidence of fracture seen.

 

POS: GISEL

## 2025-04-10 NOTE — PRG
The following individual(s) verbally consented to be recorded using ambient AI listening technology and understand that they can each withdraw their consent to this listening technology at any point by asking the clinician to turn off or pause the recording:    Patient name: Kimberlyn Marcial     DATE OF SERVICE:  12/20/2017

 

SUBJECTIVE:  An 88-year-old gentleman being seen for hyponatremia.  The patient denies any nausea, vo
miting, or chest pain.

 

OBJECTIVE:  See above.

GENERAL:  Patient is awake, alert.

VITAL SIGNS:  Afebrile, pulse 59, breathing at 16, blood pressure 126/56.

GENERAL APPEARANCE AND MENTAL STATUS:  Fair.

HEAD/NECK:  Normocephalic.  Atraumatic.

EYES:  EOMI.  No deformity.

EARS:  Clear.  No ulcers.

NOSE:  Intact.  No lesions.

MOUTH:  Clear.  No discharge.

THROAT:  Clear.  No exudate.

LUNGS:  Clear.  No crackles.

CARDIAC:  S1, S2.  No rub.

ABDOMEN:  Benign.  BS+.

GENITALIA/RECTUM:  Pearce absent.

BACK/EXTREMITIES:  Edema 0+, ulcer.

NEUROLOGICAL:  Alert and motor intact.

SKIN:  Rash - bruise.

LYMPHATICS:  Edema - ulcer.

 

LABORATORY DATA:  Labs show sodium 130.

 

ASSESSMENT AND RECOMMENDATIONS:

1.  Chronic kidney disease, stage 2, stable.

2.  Hyponatremia, improved.  We will continue fluid restriction.

3.  Medications based on glomerular filtration rate are appropriate.  No indication for hypertonic sa
line.  I will sign off on this patient.  Please reconsult as needed.